# Patient Record
Sex: FEMALE | NOT HISPANIC OR LATINO | URBAN - METROPOLITAN AREA
[De-identification: names, ages, dates, MRNs, and addresses within clinical notes are randomized per-mention and may not be internally consistent; named-entity substitution may affect disease eponyms.]

---

## 2019-04-02 ENCOUNTER — OFFICE VISIT (OUTPATIENT)
Dept: URBAN - METROPOLITAN AREA CLINIC 11 | Facility: CLINIC | Age: 54
End: 2019-04-02
Payer: COMMERCIAL

## 2019-04-02 DIAGNOSIS — H52.4 PRESBYOPIA: Primary | ICD-10-CM

## 2019-04-02 PROCEDURE — 92015 DETERMINE REFRACTIVE STATE: CPT | Performed by: OPTOMETRIST

## 2019-04-02 PROCEDURE — 92002 INTRM OPH EXAM NEW PATIENT: CPT | Performed by: OPTOMETRIST

## 2019-04-02 ASSESSMENT — INTRAOCULAR PRESSURE
OD: 20
OS: 20

## 2019-04-02 NOTE — IMPRESSION/PLAN
Impression: Open angle with borderline findings, low risk, bilateral: H40.013. Plan: monitor without drops.  Schedule DE OCT

## 2019-05-29 ENCOUNTER — OFFICE VISIT (OUTPATIENT)
Dept: URBAN - METROPOLITAN AREA CLINIC 11 | Facility: CLINIC | Age: 54
End: 2019-05-29
Payer: COMMERCIAL

## 2019-05-29 PROCEDURE — 92133 CPTRZD OPH DX IMG PST SGM ON: CPT | Performed by: OPTOMETRIST

## 2019-05-29 PROCEDURE — 92014 COMPRE OPH EXAM EST PT 1/>: CPT | Performed by: OPTOMETRIST

## 2019-05-29 ASSESSMENT — INTRAOCULAR PRESSURE
OS: 19
OD: 18

## 2020-09-23 ENCOUNTER — OFFICE VISIT (OUTPATIENT)
Dept: URBAN - METROPOLITAN AREA CLINIC 11 | Facility: CLINIC | Age: 55
End: 2020-09-23
Payer: COMMERCIAL

## 2020-09-23 PROCEDURE — 92012 INTRM OPH EXAM EST PATIENT: CPT | Performed by: OPTOMETRIST

## 2020-09-23 ASSESSMENT — VISUAL ACUITY
OS: 20/20
OD: 20/20

## 2020-09-23 ASSESSMENT — KERATOMETRY
OD: 45.38
OS: 45.50

## 2020-09-23 ASSESSMENT — INTRAOCULAR PRESSURE
OD: 19
OS: 18

## 2020-12-14 ENCOUNTER — OFFICE VISIT (OUTPATIENT)
Dept: URBAN - METROPOLITAN AREA CLINIC 11 | Facility: CLINIC | Age: 55
End: 2020-12-14
Payer: COMMERCIAL

## 2020-12-14 DIAGNOSIS — H40.013 OPEN ANGLE WITH BORDERLINE FINDINGS, LOW RISK, BILATERAL: Primary | ICD-10-CM

## 2020-12-14 PROCEDURE — 92014 COMPRE OPH EXAM EST PT 1/>: CPT | Performed by: OPTOMETRIST

## 2020-12-14 PROCEDURE — 92133 CPTRZD OPH DX IMG PST SGM ON: CPT | Performed by: OPTOMETRIST

## 2020-12-14 ASSESSMENT — INTRAOCULAR PRESSURE
OD: 19
OS: 19

## 2020-12-14 ASSESSMENT — KERATOMETRY
OD: 45.25
OS: 45.50

## 2020-12-14 NOTE — IMPRESSION/PLAN
Impression: Open angle with borderline findings, low risk, bilateral: H40.013. OCT 12/20 9/9 91/93 Plan: OCT of RNFL ordered and performed today. RNFL normal OU. Monitor without drops for now.  
f/u 1yr IOP OCT

## 2022-01-27 ENCOUNTER — OFFICE VISIT (OUTPATIENT)
Dept: URBAN - METROPOLITAN AREA CLINIC 11 | Facility: CLINIC | Age: 57
End: 2022-01-27
Payer: MEDICARE

## 2022-01-27 DIAGNOSIS — H04.123 DRY EYE SYNDROME OF BILATERAL LACRIMAL GLANDS: ICD-10-CM

## 2022-01-27 PROCEDURE — 92250 FUNDUS PHOTOGRAPHY W/I&R: CPT | Performed by: OPTOMETRIST

## 2022-01-27 PROCEDURE — 92014 COMPRE OPH EXAM EST PT 1/>: CPT | Performed by: OPTOMETRIST

## 2022-01-27 RX ORDER — FLUOROMETHOLONE 1 MG/ML
0.1 % SOLUTION/ DROPS OPHTHALMIC
Qty: 10 | Refills: 1 | Status: ACTIVE
Start: 2022-01-27

## 2022-01-27 ASSESSMENT — INTRAOCULAR PRESSURE
OS: 18
OD: 18

## 2022-01-27 NOTE — IMPRESSION/PLAN
Impression: Open angle with borderline findings, low risk, bilateral: H40.013. OCT 12/20 9/9 91/93; photos 01/22 Plan: Photos of Wilfrido Farias 73 ordered and performed today. Monitor without drops for now.  
f/u 2-3 weeks IOP check and OCT RNFL

## 2022-01-27 NOTE — IMPRESSION/PLAN
Impression: Dry eye syndrome of bilateral lacrimal glands: H04.123. Plan: Recommend art tears qid ou. (sample given today) RX FML 1gtt 3x/day OS. 
f/u 2-3 weeks

## 2022-02-23 ENCOUNTER — OFFICE VISIT (OUTPATIENT)
Dept: URBAN - METROPOLITAN AREA CLINIC 11 | Facility: CLINIC | Age: 57
End: 2022-02-23
Payer: COMMERCIAL

## 2022-02-23 PROCEDURE — 92012 INTRM OPH EXAM EST PATIENT: CPT | Performed by: OPTOMETRIST

## 2022-02-23 ASSESSMENT — INTRAOCULAR PRESSURE
OS: 20
OD: 20

## 2022-02-23 ASSESSMENT — VISUAL ACUITY
OS: 20/25
OD: 20/30

## 2022-02-23 ASSESSMENT — KERATOMETRY
OS: 45.38
OD: 45.50

## 2023-04-25 ENCOUNTER — OFFICE VISIT (OUTPATIENT)
Facility: LOCATION | Age: 58
End: 2023-04-25
Payer: MEDICARE

## 2023-04-25 DIAGNOSIS — H40.1134 PRIMARY OPEN-ANGLE GLAUCOMA, INDETERMINATE, BILATERAL: Primary | ICD-10-CM

## 2023-04-25 DIAGNOSIS — H04.123 DRY EYE SYNDROME OF BILATERAL LACRIMAL GLANDS: ICD-10-CM

## 2023-04-25 PROCEDURE — 76514 ECHO EXAM OF EYE THICKNESS: CPT | Performed by: OPTOMETRIST

## 2023-04-25 PROCEDURE — 92133 CPTRZD OPH DX IMG PST SGM ON: CPT | Performed by: OPTOMETRIST

## 2023-04-25 PROCEDURE — 99204 OFFICE O/P NEW MOD 45 MIN: CPT | Performed by: OPTOMETRIST

## 2023-04-25 RX ORDER — BRIMONIDINE TARTRATE 1 MG/ML
0.1 % SOLUTION/ DROPS OPHTHALMIC
Qty: 5 | Refills: 1 | Status: ACTIVE
Start: 2023-04-25

## 2023-04-25 RX ORDER — LIFITEGRAST 50 MG/ML
5 % SOLUTION/ DROPS OPHTHALMIC
Qty: 60 | Refills: 1 | Status: ACTIVE
Start: 2023-04-25

## 2023-04-25 ASSESSMENT — VISUAL ACUITY
OD: 20/20
OS: 20/20

## 2023-04-25 ASSESSMENT — INTRAOCULAR PRESSURE
OS: 20
OD: 20

## 2023-04-25 ASSESSMENT — KERATOMETRY
OS: 25.38
OD: 45.50

## 2023-04-25 NOTE — IMPRESSION/PLAN
Impression: Dry eye syndrome of bilateral lacrimal glands: H04.123. Plan: Dry eyes account for the patient's complaints. There is no evidence of permanent changes to the cornea. Explained condition does not have a cure and will need treatment for maintenance. It is recommended to begin Xiidra BID OU. D/C FML. Patient advised that this medication can take up to 1 month to see full effect. Patient is encouraged to continue the use of AT BID- QID, warm compresses with a dry eye mask QHS, ocusoft lid wipes QAM and omega 3/fish oil supplementation 1,000mg BID best taken with food. Patient advised to call office if symptoms do not improve.

## 2023-04-25 NOTE — IMPRESSION/PLAN
Impression: Primary open-angle glaucoma, indeterminate, bilateral: H40.2374. Plan: Discussed diagnosis with patient. RNFL-OCT and Pachs ordered and reviewed with patient. IOP is too high for the level of glaucomatous damage at the present time. Recommend lowering IOP. Patient to begin using Alphagan P BID OD, ERx'd as requested. Emphasized and explained compliance. Poor compliance can lead to blindness. Call with any changes in vision, sudden onset of pain or new symptoms. RTC: 4 weeks for IOP check. IOP (4/25/23): 20/20 Treatment: Alphagan P BID OD
C/D ratio: .75/.65
OCTG (date):
24-2 (date):
Gonio (date): Pachy (4/25/23): X/573 Allergies: none Surgery: none Family History: (+)Mother Notes:

## 2023-06-06 ENCOUNTER — OFFICE VISIT (OUTPATIENT)
Facility: LOCATION | Age: 58
End: 2023-06-06
Payer: MEDICARE

## 2023-06-06 DIAGNOSIS — H40.1131 PRIMARY OPEN-ANGLE GLAUCOMA, MILD STAGE, BILATERAL: Primary | ICD-10-CM

## 2023-06-06 DIAGNOSIS — H04.123 TEAR FILM INSUFFICIENCY OF BILATERAL LACRIMAL GLANDS: ICD-10-CM

## 2023-06-06 PROCEDURE — 99214 OFFICE O/P EST MOD 30 MIN: CPT | Performed by: OPTOMETRIST

## 2023-06-06 RX ORDER — BRIMONIDINE TARTRATE 1 MG/ML
0.1 % SOLUTION/ DROPS OPHTHALMIC
Qty: 5 | Refills: 3 | Status: ACTIVE
Start: 2023-06-06

## 2023-06-06 ASSESSMENT — INTRAOCULAR PRESSURE
OD: 18
OS: 20
OD: 20

## 2023-06-06 NOTE — IMPRESSION/PLAN
Impression: Tear film insufficiency of bilateral lacrimal glands: H04.123.
- Failed ATs alone
- Failed Xiidra (not covered by insurance) - Failed Restasis (not covered by insurance) - Failed Cyclosporine (not covered by insurance) - Failed FML (not covered by insurance) Plan: Dry eyes account for the patient's complaints. There is no evidence of permanent changes to the cornea. Explained condition does not have a cure and will need artificial tears for maintenance. Patient instructed to continue artificial tears PRN OU and Similasan. Patient to call back if symptoms do not improve in or symptoms worsen to discuss further intervention. Patient defers any additional dry eye treatment and would prefer all natural remedies moving forward.

## 2023-06-06 NOTE — IMPRESSION/PLAN
Impression: Primary open-angle glaucoma, mild stage, bilateral: H40.1131. Plan: IOP elevated OD. Recommend lowering IOP. VF 24-2 performed and evaluated at today's examination- Nonspecfic defects OU. Patient reports poor compliance with medication. Instructed patient to restart AlphaganP BID OD (ERx'd). Emphasized and explained compliance. Reassured patient of current condition and treatment and discussed risks of glaucoma progression. Will continue to monitor IOP. RTC: 6 week IOP check. IOP: 18/20 Treatment: AlphaganP BID OD
C/D ratio: .75/.65
OCTG (4/25/23): OD: S thinning, OS no thinning 24-2 (06/06/2023): OD: Good-non specific defects; OS: Good-non specific defects Pachy (4/25/2023): -/573 Allergies: none Surgery: none Family History: (+)Mother Notes: Poor compliance Hx.

## 2023-07-18 ENCOUNTER — OFFICE VISIT (OUTPATIENT)
Facility: LOCATION | Age: 58
End: 2023-07-18
Payer: MEDICARE

## 2023-07-18 DIAGNOSIS — H04.123 TEAR FILM INSUFFICIENCY OF BILATERAL LACRIMAL GLANDS: ICD-10-CM

## 2023-07-18 DIAGNOSIS — H40.1131 PRIMARY OPEN-ANGLE GLAUCOMA, MILD STAGE, BILATERAL: Primary | ICD-10-CM

## 2023-07-18 PROCEDURE — 99213 OFFICE O/P EST LOW 20 MIN: CPT | Performed by: OPTOMETRIST

## 2023-07-18 RX ORDER — BRIMONIDINE TARTRATE 1 MG/ML
0.1 % SOLUTION/ DROPS OPHTHALMIC
Qty: 5 | Refills: 3 | Status: ACTIVE
Start: 2023-07-18

## 2023-07-18 ASSESSMENT — INTRAOCULAR PRESSURE
OS: 19
OD: 16
OD: 18

## 2023-07-18 NOTE — IMPRESSION/PLAN
Impression: Primary open-angle glaucoma, mild stage, bilateral: H40.7021. Plan: Condition and IOP are stable today. No changes being made to current treatment at this time. Continue AlphaganP BID OD (ERx'd). Emphasized and explained compliance. Reassured patient of current condition and treatment and discussed risks of glaucoma progression. Will continue to monitor IOP. RTC: 4 month IOP check. IOP: 16/19 Tmax: 20/20 Target: Mid teens OD Treatment: AlphaganP BID OD
C/D ratio: .75/.65
OCTG (4/25/23): OD: S thinning, OS no thinning 24-2 (06/06/2023): OD: Good-non specific defects; OS: Good-non specific defects Pachy (4/25/2023): -/573 Allergies: none Surgery: none Family History: (+)Mother Notes: Poor compliance Hx.

## 2023-07-18 NOTE — IMPRESSION/PLAN
Impression: Tear film insufficiency of bilateral lacrimal glands: H04.123.
- Failed ATs alone
- Failed Xiidra (not covered by insurance) - Failed Restasis (not covered by insurance) - Failed Cyclosporine (not covered by insurance) - Failed FML (not covered by insurance) Plan: Dry eye condition has improved. Dry eyes account for the patient's complaints. There is no evidence of permanent changes to the cornea. Explained condition does not have a cure and will need artificial tears for maintenance. Patient instructed to continue artificial tears PRN OU. Patient to call back if symptoms do not improve or symptoms worsen to discuss further intervention.

## 2023-11-09 ENCOUNTER — OFFICE VISIT (OUTPATIENT)
Facility: LOCATION | Age: 58
End: 2023-11-09
Payer: COMMERCIAL

## 2023-11-09 DIAGNOSIS — H52.4 PRESBYOPIA: Primary | ICD-10-CM

## 2023-11-09 PROCEDURE — 92014 COMPRE OPH EXAM EST PT 1/>: CPT | Performed by: OPTOMETRIST

## 2023-11-09 ASSESSMENT — VISUAL ACUITY
OS: 20/20
OD: 20/20

## 2023-11-09 ASSESSMENT — INTRAOCULAR PRESSURE
OS: 17
OD: 18

## 2023-11-21 ENCOUNTER — OFFICE VISIT (OUTPATIENT)
Facility: LOCATION | Age: 58
End: 2023-11-21
Payer: MEDICARE

## 2023-11-21 DIAGNOSIS — H04.123 TEAR FILM INSUFFICIENCY OF BILATERAL LACRIMAL GLANDS: ICD-10-CM

## 2023-11-21 DIAGNOSIS — H40.1111 PRIMARY OPEN-ANGLE GLAUCOMA, MILD STAGE, RIGHT EYE: Primary | ICD-10-CM

## 2023-11-21 DIAGNOSIS — H40.022 OPEN ANGLE WITH BORDERLINE FINDINGS, HIGH RISK, LEFT EYE: ICD-10-CM

## 2023-11-21 PROCEDURE — 92133 CPTRZD OPH DX IMG PST SGM ON: CPT | Performed by: OPTOMETRIST

## 2023-11-21 PROCEDURE — 99213 OFFICE O/P EST LOW 20 MIN: CPT | Performed by: OPTOMETRIST

## 2024-01-23 ENCOUNTER — OFFICE VISIT (OUTPATIENT)
Facility: LOCATION | Age: 59
End: 2024-01-23
Payer: MEDICARE

## 2024-01-23 DIAGNOSIS — H11.32 SUBCONJUNCTIVAL HEMORRHAGE OF LEFT EYE: Primary | ICD-10-CM

## 2024-01-23 PROCEDURE — 99213 OFFICE O/P EST LOW 20 MIN: CPT

## 2024-01-23 RX ORDER — TIMOLOL MALEATE 5 MG/ML
0.5 % SOLUTION/ DROPS OPHTHALMIC
Qty: 5 | Refills: 2 | Status: ACTIVE
Start: 2024-01-23

## 2024-01-23 ASSESSMENT — INTRAOCULAR PRESSURE
OD: 18
OS: 16

## 2024-02-27 ENCOUNTER — OFFICE VISIT (OUTPATIENT)
Facility: LOCATION | Age: 59
End: 2024-02-27
Payer: MEDICARE

## 2024-02-27 DIAGNOSIS — H04.123 TEAR FILM INSUFFICIENCY OF BILATERAL LACRIMAL GLANDS: ICD-10-CM

## 2024-02-27 DIAGNOSIS — H40.1111 PRIMARY OPEN-ANGLE GLAUCOMA, MILD STAGE, RIGHT EYE: Primary | ICD-10-CM

## 2024-02-27 PROCEDURE — 92012 INTRM OPH EXAM EST PATIENT: CPT

## 2024-02-27 RX ORDER — TIMOLOL MALEATE 5 MG/ML
0.5 % SOLUTION/ DROPS OPHTHALMIC
Qty: 5 | Refills: 3 | Status: ACTIVE
Start: 2024-02-27

## 2024-02-27 ASSESSMENT — INTRAOCULAR PRESSURE
OS: 15
OD: 15

## 2024-04-15 ENCOUNTER — OFFICE VISIT (OUTPATIENT)
Facility: LOCATION | Age: 59
End: 2024-04-15
Payer: MEDICARE

## 2024-04-15 DIAGNOSIS — H40.1111 PRIMARY OPEN-ANGLE GLAUCOMA, RIGHT EYE, MILD STAGE: Primary | ICD-10-CM

## 2024-04-15 DIAGNOSIS — H40.022 OPEN ANGLE WITH BORDERLINE FINDINGS, HIGH RISK, LEFT EYE: ICD-10-CM

## 2024-04-15 PROCEDURE — 92083 EXTENDED VISUAL FIELD XM: CPT | Performed by: OPTOMETRIST

## 2024-04-15 PROCEDURE — 99213 OFFICE O/P EST LOW 20 MIN: CPT | Performed by: OPTOMETRIST

## 2024-04-15 ASSESSMENT — INTRAOCULAR PRESSURE
OD: 16
OS: 19

## 2024-04-15 ASSESSMENT — VISUAL ACUITY
OD: 20/20
OS: 20/20